# Patient Record
Sex: MALE | Race: WHITE | Employment: FULL TIME | URBAN - NONMETROPOLITAN AREA
[De-identification: names, ages, dates, MRNs, and addresses within clinical notes are randomized per-mention and may not be internally consistent; named-entity substitution may affect disease eponyms.]

---

## 2023-07-22 ENCOUNTER — HOSPITAL ENCOUNTER (OUTPATIENT)
Dept: GENERAL RADIOLOGY | Age: 53
Discharge: HOME | End: 2023-07-22
Attending: INTERNAL MEDICINE
Payer: COMMERCIAL

## 2023-07-22 ENCOUNTER — HOSPITAL ENCOUNTER (OUTPATIENT)
Age: 53
Discharge: HOME | End: 2023-07-22
Payer: COMMERCIAL

## 2023-07-22 DIAGNOSIS — M79.672 LEFT FOOT PAIN: ICD-10-CM

## 2023-07-22 DIAGNOSIS — M79.671 FOOT PAIN, RIGHT: ICD-10-CM

## 2023-07-22 DIAGNOSIS — M79.642 LEFT HAND PAIN: ICD-10-CM

## 2023-07-22 DIAGNOSIS — M79.641 RIGHT HAND PAIN: ICD-10-CM

## 2023-07-22 PROCEDURE — 73130 X-RAY EXAM OF HAND: CPT

## 2023-07-22 PROCEDURE — 73630 X-RAY EXAM OF FOOT: CPT

## 2024-03-22 LAB
ALT SERPL-CCNC: 30 U/L (ref 0–60)
AST SERPL-CCNC: 25 U/L (ref 0–55)
BUN / CREAT RATIO: 17 (ref 7–25)
BUN BLDV-MCNC: 12 MG/DL (ref 3–29)
CREAT SERPL-MCNC: 0.7 MG/DL (ref 0.5–1.2)
ERYTHROCYTE SEDIMENTATION RATE: 37 MM/HR (ref 0–20)
HCT VFR BLD CALC: 45.8 % (ref 37.5–51)
HEMOGLOBIN: 16.1 G/DL (ref 13–17.7)
MCH RBC QN AUTO: 32.5 PG (ref 26–34)
MCHC RBC AUTO-ENTMCNC: 35.2 G/DL (ref 30.7–35.5)
MCV RBC AUTO: 92.5 FL (ref 80–100)
PDW BLD-RTO: 13.6 %
PLATELET # BLD: 234 K/UL (ref 140–400)
PMV BLD AUTO: 11.3 FL (ref 7.2–11.7)
RBC # BLD: 4.95 M/UL (ref 4.14–5.8)
WBC # BLD: 6.1 K/UL (ref 3.5–10.9)

## 2024-07-18 ENCOUNTER — HOSPITAL ENCOUNTER (OUTPATIENT)
Dept: SLEEP CENTER | Age: 54
Discharge: HOME | End: 2024-07-18
Payer: COMMERCIAL

## 2024-07-18 VITALS
HEART RATE: 90 BPM | DIASTOLIC BLOOD PRESSURE: 85 MMHG | WEIGHT: 237 LBS | OXYGEN SATURATION: 94 % | BODY MASS INDEX: 33.18 KG/M2 | HEIGHT: 71 IN | SYSTOLIC BLOOD PRESSURE: 146 MMHG

## 2024-07-18 DIAGNOSIS — G47.10 HYPERSOMNIA: ICD-10-CM

## 2024-07-18 DIAGNOSIS — G47.33 OSA (OBSTRUCTIVE SLEEP APNEA): Primary | ICD-10-CM

## 2024-07-18 DIAGNOSIS — E66.9 OBESITY (BMI 30-39.9): ICD-10-CM

## 2024-07-18 DIAGNOSIS — F17.210 CIGARETTE SMOKER: ICD-10-CM

## 2024-07-18 PROCEDURE — 99211 OFF/OP EST MAY X REQ PHY/QHP: CPT

## 2024-07-18 PROCEDURE — 99204 OFFICE O/P NEW MOD 45 MIN: CPT | Performed by: INTERNAL MEDICINE

## 2024-07-18 RX ORDER — PREDNISONE 5 MG/1
5 TABLET ORAL DAILY
COMMUNITY

## 2024-07-18 RX ORDER — METHOTREXATE SODIUM 1 G/1
INJECTION, POWDER, LYOPHILIZED, FOR SOLUTION INTRA-ARTERIAL; INTRAMUSCULAR; INTRATHECAL; INTRAVENOUS ONCE
COMMUNITY

## 2024-07-18 RX ORDER — M-VIT,TX,IRON,MINS/CALC/FOLIC 27MG-0.4MG
1 TABLET ORAL DAILY
COMMUNITY

## 2024-07-18 ASSESSMENT — SLEEP AND FATIGUE QUESTIONNAIRES
HOW LIKELY ARE YOU TO NOD OFF OR FALL ASLEEP WHILE SITTING QUIETLY AFTER LUNCH WITHOUT ALCOHOL: HIGH CHANCE OF DOZING
HOW LIKELY ARE YOU TO NOD OFF OR FALL ASLEEP IN A CAR, WHILE STOPPED FOR A FEW MINUTES IN TRAFFIC: WOULD NEVER DOZE
ESS TOTAL SCORE: 9
HOW LIKELY ARE YOU TO NOD OFF OR FALL ASLEEP WHILE WATCHING TV: MODERATE CHANCE OF DOZING
HOW LIKELY ARE YOU TO NOD OFF OR FALL ASLEEP WHILE SITTING AND READING: SLIGHT CHANCE OF DOZING
HOW LIKELY ARE YOU TO NOD OFF OR FALL ASLEEP WHILE LYING DOWN TO REST IN THE AFTERNOON WHEN CIRCUMSTANCES PERMIT: HIGH CHANCE OF DOZING
HOW LIKELY ARE YOU TO NOD OFF OR FALL ASLEEP WHILE SITTING INACTIVE IN A PUBLIC PLACE: WOULD NEVER DOZE
HOW LIKELY ARE YOU TO NOD OFF OR FALL ASLEEP WHILE SITTING AND TALKING TO SOMEONE: WOULD NEVER DOZE
HOW LIKELY ARE YOU TO NOD OFF OR FALL ASLEEP WHEN YOU ARE A PASSENGER IN A CAR FOR AN HOUR WITHOUT A BREAK: WOULD NEVER DOZE

## 2024-07-18 NOTE — CONSULTS
exposed extremities.   Lymph: No cervical LAD. No supraclavicular LAD.   M/S: No cyanosis. No clubbing. No joint deformity.    Psych: Oriented x 3. No anxiety.  Awake. Alert. Intact judgement and insight.    Mallampati Airway Classification:   []1 []2 [x]3 []4        Assessment and Plan     Diagnosis:    Problem List          Respiratory    ALEXANDRA (obstructive sleep apnea) - Primary      He has symptoms of ALEXANDRA  Advised to go for the HST  Loose weight         Relevant Orders    Home Sleep Study       Other    Hypersomnia     He has symptoms of ALEXANDRA  Advised to go for the HST  Loose weight         Obesity (BMI 30-39.9)      Advised to loose weight with diet and exercise           Cigarette smoker      Advised to quit smoking  PFT  Low Dose CT chest  No flu vaccine per patient         Relevant Orders    Full PFT Study With Bronchodilator    CT LUNG SCREENING             Additional Plan:     [x]  Sleep hygiene/ relaxation methods & CBTi principles review with patient   [x]  Avoid supine/back sleep until sleep study   [x]  Driving precautions   [x]  Medical consequences of untreated ALEXANDRA   [x]  Weight loss recommendations   [x]  Diet recommendations   [x]  Exercise   [x]  Advised to quit smoking       []  PFT referral   []  Bariatric Program referral    Total time spent for this encounter: 45 mins    Follow-Up:    No follow-ups on file.    Electronically signed by Anjum Cota MD on 7/18/2024 at 10:14 AM

## 2024-07-25 ENCOUNTER — HOSPITAL ENCOUNTER (OUTPATIENT)
Dept: CT IMAGING | Age: 54
Discharge: HOME | End: 2024-07-25
Attending: INTERNAL MEDICINE
Payer: COMMERCIAL

## 2024-07-25 DIAGNOSIS — F17.210 CIGARETTE SMOKER: ICD-10-CM

## 2024-07-25 PROCEDURE — 71271 CT THORAX LUNG CANCER SCR C-: CPT

## 2024-07-29 ENCOUNTER — TELEPHONE (OUTPATIENT)
Dept: SLEEP CENTER | Age: 54
End: 2024-07-29

## 2024-08-12 ENCOUNTER — HOSPITAL ENCOUNTER (OUTPATIENT)
Dept: SLEEP CENTER | Age: 54
Discharge: HOME | End: 2024-08-12
Payer: COMMERCIAL

## 2024-08-12 DIAGNOSIS — G47.33 OSA (OBSTRUCTIVE SLEEP APNEA): ICD-10-CM

## 2024-08-12 PROCEDURE — G0399 HOME SLEEP TEST/TYPE 3 PORTA: HCPCS

## 2024-08-12 NOTE — PROGRESS NOTES
Herb MICHEAL Bernardo  1970  [facility] for set up and instruction of home sleep study with the [facility] unit.  he was instructed on proper set-up and operation of HST unit. Written instructions with set up diagram given for reference and reinforcement of verbal instruction and demonstration. he was able to return demonstration appropriately. No home environment, vision, dexterity, comprehension concerns with this patient based on completed forms and patient interactions. Patient will return unit after one night as instructed.    Electronically signed by Nimco Car on 8/12/2024 at 3:46 PM

## 2024-08-14 ENCOUNTER — HOSPITAL ENCOUNTER (OUTPATIENT)
Dept: CARDIAC REHAB | Age: 54
Discharge: HOME | End: 2024-08-14
Payer: COMMERCIAL

## 2024-08-14 DIAGNOSIS — F17.210 CIGARETTE SMOKER: ICD-10-CM

## 2024-08-14 PROCEDURE — 94729 DIFFUSING CAPACITY: CPT

## 2024-08-14 PROCEDURE — 94060 EVALUATION OF WHEEZING: CPT

## 2024-08-14 PROCEDURE — 94640 AIRWAY INHALATION TREATMENT: CPT

## 2024-08-14 PROCEDURE — 94727 GAS DIL/WSHOT DETER LNG VOL: CPT

## 2024-08-15 PROCEDURE — 95806 SLEEP STUDY UNATT&RESP EFFT: CPT | Performed by: INTERNAL MEDICINE

## 2024-08-16 LAB
ALT SERPL-CCNC: 17 U/L (ref 0–60)
AST SERPL-CCNC: 20 U/L (ref 0–55)
BUN / CREAT RATIO: 19 (ref 7–25)
BUN BLDV-MCNC: 13 MG/DL (ref 3–29)
CREAT SERPL-MCNC: 0.7 MG/DL (ref 0.5–1.2)
HCT VFR BLD CALC: 42.9 % (ref 37.5–51)
HEMOGLOBIN: 15 G/DL (ref 13–17.7)
MCH RBC QN AUTO: 32.1 PG (ref 26–34)
MCHC RBC AUTO-ENTMCNC: 35 G/DL (ref 30.7–35.5)
MCV RBC AUTO: 91.7 FL (ref 80–100)
PDW BLD-RTO: 12.7 %
PLATELET # BLD: 234 K/UL (ref 140–400)
PMV BLD AUTO: 12 FL (ref 7.2–11.7)
RBC # BLD: 4.68 M/UL (ref 4.14–5.8)
WBC # BLD: 6.3 K/UL (ref 3.5–10.9)

## 2024-08-21 ENCOUNTER — HOSPITAL ENCOUNTER (OUTPATIENT)
Dept: SLEEP CENTER | Age: 54
Discharge: HOME | End: 2024-08-21
Payer: COMMERCIAL

## 2024-08-21 DIAGNOSIS — E66.9 OBESITY (BMI 30-39.9): ICD-10-CM

## 2024-08-21 DIAGNOSIS — G47.33 OSA (OBSTRUCTIVE SLEEP APNEA): Primary | ICD-10-CM

## 2024-08-21 DIAGNOSIS — G47.10 HYPERSOMNIA: ICD-10-CM

## 2024-08-21 DIAGNOSIS — F17.210 CIGARETTE SMOKER: ICD-10-CM

## 2024-08-21 PROCEDURE — 99211 OFF/OP EST MAY X REQ PHY/QHP: CPT

## 2024-08-21 PROCEDURE — 99215 OFFICE O/P EST HI 40 MIN: CPT | Performed by: INTERNAL MEDICINE

## 2024-08-21 RX ORDER — METHOTREXATE 2.5 MG/1
2.5 TABLET ORAL WEEKLY
COMMUNITY

## 2024-08-21 ASSESSMENT — ENCOUNTER SYMPTOMS
EYE DISCHARGE: 0
EYE ITCHING: 0
COUGH: 0
SHORTNESS OF BREATH: 0
ABDOMINAL PAIN: 0
BACK PAIN: 0
ABDOMINAL DISTENTION: 0

## 2024-08-21 NOTE — PROGRESS NOTES
and Sexual Activity    Alcohol use: Yes     Alcohol/week: 5.0 standard drinks of alcohol     Types: 6 drink(s) per week     Comment: every other day    Drug use: No       Review of Systems   Constitutional:  Positive for fatigue.   HENT:  Negative for congestion and postnasal drip.    Eyes:  Negative for discharge and itching.   Respiratory:  Negative for cough and shortness of breath.    Cardiovascular:  Negative for chest pain and leg swelling.   Gastrointestinal:  Negative for abdominal distention and abdominal pain.   Endocrine: Negative for cold intolerance and heat intolerance.   Genitourinary:  Negative for enuresis and frequency.   Musculoskeletal:  Negative for arthralgias and back pain.   Allergic/Immunologic: Negative for environmental allergies and food allergies.   Neurological:  Negative for light-headedness and headaches.   Hematological:  Negative for adenopathy.   Psychiatric/Behavioral:  Negative for agitation and behavioral problems.        Objective:   There were no vitals taken for this visit.  There is no height or weight on file to calculate BMI.      7/18/2024     9:50 AM 7/18/2024     9:49 AM   Sleep Medicine   Sitting and reading  1   Watching TV  2   Sitting, inactive in a public place (e.g. a theatre or a meeting)  0   As a passenger in a car for an hour without a break  0   Lying down to rest in the afternoon when circumstances permit  3   Sitting and talking to someone  0   Sitting quietly after a lunch without alcohol  3   In a car, while stopped for a few minutes in traffic  0   Warren Sleepiness Score  9   Neck (Inches) 17 17     Mallampati 3    Physical Exam  Vitals reviewed.   Constitutional:       Appearance: Normal appearance.   HENT:      Head: Normocephalic and atraumatic.      Nose: Nose normal.      Mouth/Throat:      Mouth: Mucous membranes are moist.   Eyes:      Extraocular Movements: Extraocular movements intact.      Pupils: Pupils are equal, round, and reactive to

## 2024-09-16 ENCOUNTER — TELEPHONE (OUTPATIENT)
Dept: PULMONOLOGY | Age: 54
End: 2024-09-16

## 2024-09-23 ENCOUNTER — TELEPHONE (OUTPATIENT)
Dept: PULMONOLOGY | Age: 54
End: 2024-09-23

## 2024-09-26 LAB
ALT SERPL-CCNC: 21 U/L (ref 0–60)
AST SERPL-CCNC: 21 U/L (ref 0–55)
BUN / CREAT RATIO: 15 (ref 7–25)
BUN BLDV-MCNC: 12 MG/DL (ref 3–29)
CREAT SERPL-MCNC: 0.8 MG/DL (ref 0.5–1.2)
HCT VFR BLD CALC: 44 % (ref 37.5–51)
HEMOGLOBIN: 15.1 G/DL (ref 13–17.7)
MCH RBC QN AUTO: 31.6 PG (ref 26–34)
MCHC RBC AUTO-ENTMCNC: 34.3 G/DL (ref 30.7–35.5)
MCV RBC AUTO: 92.1 FL (ref 80–100)
PDW BLD-RTO: 12.7 %
PLATELET # BLD: 279 K/UL (ref 140–400)
PMV BLD AUTO: 10.3 FL (ref 7.2–11.7)
RBC # BLD: 4.78 M/UL (ref 4.14–5.8)
SED RATE, AUTOMATED: 57 MM/HR (ref 0–20)
WBC # BLD: 4.4 K/UL (ref 3.5–10.9)

## 2024-09-27 LAB
IGNF NEG CNTRL BLD: 0
M. TUBERCULOSIS STIM IFN-G CFP10 AG SPOT COUNT: 0
M. TUBERCULOSIS STIM IFN-G ESAT-6 AG SPOT COUNT: 0
MITOGEN IGNF.SPOT COUNT BLD: >20
MYCOBACTERIUM TUBERCULOSIS STIMULATED GAMMA INTERFERON AND SPOT COUNT PANEL: NEGATIVE

## 2024-11-04 ENCOUNTER — OFFICE VISIT (OUTPATIENT)
Dept: PULMONOLOGY | Age: 54
End: 2024-11-04
Payer: COMMERCIAL

## 2024-11-04 VITALS
DIASTOLIC BLOOD PRESSURE: 74 MMHG | HEIGHT: 71 IN | HEART RATE: 79 BPM | OXYGEN SATURATION: 96 % | BODY MASS INDEX: 32.34 KG/M2 | SYSTOLIC BLOOD PRESSURE: 136 MMHG | WEIGHT: 231 LBS

## 2024-11-04 DIAGNOSIS — G47.10 HYPERSOMNIA: ICD-10-CM

## 2024-11-04 DIAGNOSIS — G47.33 OSA (OBSTRUCTIVE SLEEP APNEA): Primary | ICD-10-CM

## 2024-11-04 DIAGNOSIS — E66.9 OBESITY (BMI 30-39.9): ICD-10-CM

## 2024-11-04 DIAGNOSIS — F17.210 CIGARETTE SMOKER: ICD-10-CM

## 2024-11-04 PROCEDURE — G8427 DOCREV CUR MEDS BY ELIG CLIN: HCPCS | Performed by: INTERNAL MEDICINE

## 2024-11-04 PROCEDURE — 99215 OFFICE O/P EST HI 40 MIN: CPT | Performed by: INTERNAL MEDICINE

## 2024-11-04 PROCEDURE — 4004F PT TOBACCO SCREEN RCVD TLK: CPT | Performed by: INTERNAL MEDICINE

## 2024-11-04 PROCEDURE — 3017F COLORECTAL CA SCREEN DOC REV: CPT | Performed by: INTERNAL MEDICINE

## 2024-11-04 PROCEDURE — G8417 CALC BMI ABV UP PARAM F/U: HCPCS | Performed by: INTERNAL MEDICINE

## 2024-11-04 PROCEDURE — G8484 FLU IMMUNIZE NO ADMIN: HCPCS | Performed by: INTERNAL MEDICINE

## 2024-11-04 RX ORDER — COVID-19 ANTIGEN TEST
220 KIT MISCELLANEOUS PRN
COMMUNITY

## 2024-11-04 ASSESSMENT — ENCOUNTER SYMPTOMS
ABDOMINAL PAIN: 0
EYE ITCHING: 0
COUGH: 0
SHORTNESS OF BREATH: 0
EYE DISCHARGE: 0
BACK PAIN: 0
ABDOMINAL DISTENTION: 0

## 2024-11-04 NOTE — PROGRESS NOTES
Herb Bernardo  1970  [facility]     Subjective:     Chief Complaint   Patient presents with    Sleep Apnea     Compliance       HPI  Herb is a 54 y.o. male has come back as a follow up. He has Moderate ALEXANDRA on a Auto CPAP which he is using it every night about 6 to 7 hours. He says that it is helping him. He has 6 lb weight loss. He has a FFM. He is not sleepy or tired during the day time. His 2 week download data showed a residual AHI is 2.6 and leak is 0.8 L/min and his 95th percentile pressure is 18.6 cm h20.    He has a 30 pk yr smoking and still smoking less than 1 pk/day. He has no symptoms.  He had a Low Dose CT chest done on 07/25/24 showed that is negative for the any masses or nodules.His PFT done on 08/14/24 showed that it is essentially normal.        Current Outpatient Medications   Medication Sig Dispense Refill    Naproxen Sodium (ALEVE) 220 MG CAPS Take 220 mg by mouth as needed      methotrexate (RHEUMATREX) 2.5 MG chemo tablet Take 1 tablet by mouth once a week 5 pills      Multiple Vitamins-Minerals (THERAPEUTIC MULTIVITAMIN-MINERALS) tablet Take 1 tablet by mouth daily      predniSONE (DELTASONE) 5 MG tablet Take 1 tablet by mouth daily       No current facility-administered medications for this visit.       Allergies   Allergen Reactions    Ragwitek [Short Ragweed Pollen Ext] Other (See Comments)     Seasonal allergy       Past Medical History:   Diagnosis Date    Allergic rhinitis     Anxiety     Arthritis     Asthma in remission     hx asthma as child    Chronic rheumatic arthritis (HCC)     Dr parada    Chronic rheumatic arthritis (HCC)        Past Surgical History:   Procedure Laterality Date    APPENDECTOMY      ENDOSCOPY, COLON, DIAGNOSTIC      LARYNX SURGERY      nodules removed    WISDOM TOOTH EXTRACTION         Social History     Socioeconomic History    Marital status:      Spouse name: None    Number of children: None    Years of

## 2024-11-05 ENCOUNTER — TELEPHONE (OUTPATIENT)
Dept: PULMONOLOGY | Age: 54
End: 2024-11-05

## 2024-11-05 NOTE — TELEPHONE ENCOUNTER
Faxed notes to [facility] for compliance.  Recvd confirmation fax   Report taken from YULI Ocampo. 

## 2025-02-21 LAB
ALT SERPL-CCNC: 27 U/L (ref 0–60)
AST SERPL-CCNC: 18 U/L (ref 0–55)
BUN / CREAT RATIO: 18 (ref 7–25)
BUN BLDV-MCNC: 14 MG/DL (ref 3–29)
CREAT SERPL-MCNC: 0.8 MG/DL (ref 0.5–1.2)
HCT VFR BLD CALC: 42.8 % (ref 37.5–51)
HEMOGLOBIN: 14.7 G/DL (ref 13–17.7)
MCH RBC QN AUTO: 31.5 PG (ref 26–34)
MCHC RBC AUTO-ENTMCNC: 34.3 G/DL (ref 30.7–35.5)
MCV RBC AUTO: 91.6 FL (ref 80–100)
PDW BLD-RTO: 12.7 %
PLATELET # BLD: 266 K/UL (ref 140–400)
PMV BLD AUTO: 11.2 FL (ref 7.2–11.7)
RBC # BLD: 4.67 M/UL (ref 4.14–5.8)
SED RATE, AUTOMATED: 46 MM/HR (ref 0–20)
WBC # BLD: 5.5 K/UL (ref 3.5–10.9)

## 2025-07-16 LAB
ALT SERPL-CCNC: 23 U/L (ref 0–60)
AST SERPL-CCNC: 21 U/L (ref 0–55)
BUN / CREAT RATIO: 16 (ref 7–25)
BUN BLDV-MCNC: 13 MG/DL (ref 3–29)
CREAT SERPL-MCNC: 0.8 MG/DL (ref 0.5–1.2)
HCT VFR BLD CALC: 44 % (ref 37.5–51)
HEMOGLOBIN: 14.9 G/DL (ref 13–17.7)
MCH RBC QN AUTO: 31 PG (ref 26–34)
MCHC RBC AUTO-ENTMCNC: 33.9 G/DL (ref 30.7–35.5)
MCV RBC AUTO: 91.7 FL (ref 80–100)
PDW BLD-RTO: 13.8 %
PLATELET # BLD: 237 K/UL (ref 140–400)
PMV BLD AUTO: 11.2 FL (ref 7.2–11.7)
RBC # BLD: 4.8 M/UL (ref 4.14–5.8)
SED RATE, AUTOMATED: 47 MM/HR (ref 0–20)
WBC # BLD: 5.3 K/UL (ref 3.5–10.9)

## 2025-08-08 ENCOUNTER — HOSPITAL ENCOUNTER (OUTPATIENT)
Dept: CT IMAGING | Age: 55
Discharge: HOME | End: 2025-08-08
Attending: INTERNAL MEDICINE
Payer: COMMERCIAL

## 2025-08-08 DIAGNOSIS — F17.210 CIGARETTE SMOKER: ICD-10-CM

## 2025-08-08 PROCEDURE — 71271 CT THORAX LUNG CANCER SCR C-: CPT

## 2025-08-13 ENCOUNTER — HOSPITAL ENCOUNTER (OUTPATIENT)
Dept: CARDIAC REHAB | Age: 55
Discharge: HOME | End: 2025-08-13
Payer: COMMERCIAL

## 2025-08-13 DIAGNOSIS — F17.210 CIGARETTE SMOKER: ICD-10-CM

## 2025-08-13 PROCEDURE — 94729 DIFFUSING CAPACITY: CPT

## 2025-08-13 PROCEDURE — 94060 EVALUATION OF WHEEZING: CPT

## 2025-08-13 PROCEDURE — 94727 GAS DIL/WSHOT DETER LNG VOL: CPT

## 2025-08-13 PROCEDURE — 94640 AIRWAY INHALATION TREATMENT: CPT
